# Patient Record
Sex: MALE | Race: WHITE | NOT HISPANIC OR LATINO | Employment: OTHER | URBAN - METROPOLITAN AREA
[De-identification: names, ages, dates, MRNs, and addresses within clinical notes are randomized per-mention and may not be internally consistent; named-entity substitution may affect disease eponyms.]

---

## 2021-09-27 ENCOUNTER — APPOINTMENT (OUTPATIENT)
Dept: GENERAL RADIOLOGY | Facility: CLINIC | Age: 56
End: 2021-09-27
Attending: PHYSICIAN ASSISTANT
Payer: COMMERCIAL

## 2021-09-27 ENCOUNTER — HOSPITAL ENCOUNTER (EMERGENCY)
Facility: CLINIC | Age: 56
Discharge: HOME | End: 2021-09-27
Attending: PHYSICIAN ASSISTANT | Admitting: PHYSICIAN ASSISTANT
Payer: COMMERCIAL

## 2021-09-27 VITALS
SYSTOLIC BLOOD PRESSURE: 126 MMHG | OXYGEN SATURATION: 98 % | DIASTOLIC BLOOD PRESSURE: 68 MMHG | WEIGHT: 220 LBS | HEART RATE: 67 BPM | TEMPERATURE: 98.1 F | RESPIRATION RATE: 20 BRPM

## 2021-09-27 DIAGNOSIS — M25.561 ACUTE PAIN OF RIGHT KNEE: ICD-10-CM

## 2021-09-27 PROCEDURE — 73562 X-RAY EXAM OF KNEE 3: CPT | Mod: RT

## 2021-09-27 PROCEDURE — 99284 EMERGENCY DEPT VISIT MOD MDM: CPT | Performed by: PHYSICIAN ASSISTANT

## 2021-09-27 PROCEDURE — 29505 APPLICATION LONG LEG SPLINT: CPT | Mod: RT | Performed by: PHYSICIAN ASSISTANT

## 2021-09-27 PROCEDURE — 99284 EMERGENCY DEPT VISIT MOD MDM: CPT | Mod: 25 | Performed by: PHYSICIAN ASSISTANT

## 2021-09-27 RX ORDER — OMEPRAZOLE 40 MG/1
40 CAPSULE, DELAYED RELEASE ORAL EVERY MORNING
COMMUNITY
Start: 2021-01-04

## 2021-09-27 RX ORDER — SIMVASTATIN 40 MG
40 TABLET ORAL EVERY MORNING
COMMUNITY
Start: 2021-01-04

## 2021-09-27 RX ORDER — OXYCODONE HYDROCHLORIDE 5 MG/1
5 TABLET ORAL EVERY 6 HOURS PRN
Qty: 20 TABLET | Refills: 0 | Status: SHIPPED | OUTPATIENT
Start: 2021-09-27

## 2021-09-27 RX ORDER — LOSARTAN POTASSIUM AND HYDROCHLOROTHIAZIDE 12.5; 1 MG/1; MG/1
1 TABLET ORAL EVERY MORNING
COMMUNITY
Start: 2021-01-04

## 2021-09-27 NOTE — ED TRIAGE NOTES
Pt was doing block work and fell hitting his right knee, he has extreme pain and is not able to bear weight, reports the knee david if he tries to stand on it

## 2021-09-27 NOTE — DISCHARGE INSTRUCTIONS
It was a pleasure working with you today!  I hope your condition improves rapidly!     Unfortunately, I think that you likely ruptured your quadriceps tendon at the kneecap.  The x-ray did not show any sign of fracture.  Ultimately, you will need an MRI for confirmation of this concern.  Please call home today and speak with your primary care provider to see if they can set something up for when you get home.  You also need to see an orthopedist in the next 1 week to discuss the MRI results and develop a plan.  You likely will need surgery for this.  Please keep your knee straight at all times.  Use your knee immobilizer.  Do NOT bear weight on your right leg at all.  Use the crutches at all times.  You can use ibuprofen 600 mg every 6 hours as needed for pain.  You can also use Tylenol up to 1000 mg every 6 hours as needed for pain.  Reserve the oxycodone for severe breakthrough pain.  Do not drive due to your injury, or operate any machinery while taking oxycodone given the sedation side effect.  To try and prevent blood clot during your travel, take 325 mg of aspirin once daily for couple days prior to your departure and then for a couple days after you return.  Please also move your ankle a lot during your travel which helps prevent blood clot.

## 2021-09-27 NOTE — ED PROVIDER NOTES
History     Chief Complaint   Patient presents with     Knee Pain     HPI  Carrington Langston is a 56 year old male who presents for evaluation of right knee pain.  Acute in onset.  Standing on a cement block on the ground.  The cement block turned over and he fell to the ground.  He is unsure if he fell onto the knee or not.  He had immediate onset of pain and rates his pain 6 on a scale of 10 currently.  States he has difficulties lifting his leg.  Has not taken anything to treat his pain.  Denies needing anything at this point.  Has never had troubles with his knee before.  He actually lives in [facility] and is up here helping his father of a house.  He denies any lower extremity numbness or tingling.  He denies any injuries elsewhere on his body.        Allergies:  No Known Allergies    Problem List:    There are no problems to display for this patient.       Past Medical History:    History reviewed. No pertinent past medical history.    Past Surgical History:    History reviewed. No pertinent surgical history.    Family History:    History reviewed. No pertinent family history.    Social History:  Marital Status:   [2]  Social History     Tobacco Use     Smoking status: Never Smoker     Smokeless tobacco: Never Used   Substance Use Topics     Alcohol use: Not Currently     Drug use: Not Currently        Medications:    losartan-hydrochlorothiazide (HYZAAR) 100-12.5 MG tablet  omeprazole (PRILOSEC) 40 MG DR capsule  oxyCODONE (ROXICODONE) 5 MG tablet  simvastatin (ZOCOR) 40 MG tablet          Review of Systems   All other systems reviewed and are negative.      Physical Exam   BP: 126/68  Pulse: 67  Temp: 98.1  F (36.7  C)  Resp: 20  Weight: 99.8 kg (220 lb)  SpO2: 98 %      Physical Exam  Vitals and nursing note reviewed.   Constitutional:       General: He is not in acute distress.     Appearance: He is not diaphoretic.   HENT:      Head: Normocephalic and atraumatic.      Right Ear:  External ear normal.      Left Ear: External ear normal.      Nose: Nose normal.      Mouth/Throat:      Pharynx: No oropharyngeal exudate.   Eyes:      General: No scleral icterus.        Right eye: No discharge.         Left eye: No discharge.      Conjunctiva/sclera: Conjunctivae normal.      Pupils: Pupils are equal, round, and reactive to light.   Neck:      Thyroid: No thyromegaly.   Cardiovascular:      Rate and Rhythm: Normal rate and regular rhythm.      Heart sounds: Normal heart sounds. No murmur heard.     Pulmonary:      Effort: Pulmonary effort is normal. No respiratory distress.      Breath sounds: Normal breath sounds. No wheezing or rales.   Chest:      Chest wall: No tenderness.   Abdominal:      General: Bowel sounds are normal. There is no distension.      Palpations: Abdomen is soft. There is no mass.      Tenderness: There is no abdominal tenderness. There is no guarding or rebound.   Musculoskeletal:      Cervical back: Normal range of motion and neck supple.      Comments: Knee with mild generalized swelling.  No bruising.  No abrasions or lacerations.  Patient has difficulties with lower leg extension.  He has a hard time lifting his leg off of the bed.  There appears to be a divot above his patella and I am concerned about quadricep rupture.No ligamentous instability with testing of the MCL, LCL, ACL, and PCL ligaments.   Distal pulses 2+ bilaterally.  Sensation intact to light touch.  Opposite knee exam is normal.    Lymphadenopathy:      Cervical: No cervical adenopathy.   Skin:     General: Skin is warm and dry.      Capillary Refill: Capillary refill takes less than 2 seconds.      Findings: No erythema or rash.   Neurological:      Mental Status: He is alert and oriented to person, place, and time.      Cranial Nerves: No cranial nerve deficit.   Psychiatric:         Behavior: Behavior normal.         Thought Content: Thought content normal.         ED Course        Procedures               Critical Care time:  none               Results for orders placed or performed during the hospital encounter of 09/27/21 (from the past 24 hour(s))   XR Knee Right 3 Views    Narrative    Examination:  XR KNEE RIGHT 3 VIEWS    Date:  9/27/2021 1:08 PM     Clinical Information: Right anterior superior knee pain after a fall.    Comparison: none.      Impression    Impression:    1.  Mode prominence/swelling of the prepatellar soft tissues. No  radiopaque foreign body. No fracture or traumatic malalignment.  Maintained joint spacing. No significant joint effusion.    KYLE ALLEN MD         SYSTEM ID:  [id number]       Medications - No data to display    Assessments & Plan (with Medical Decision Making)  Acute pain of right knee     56 year old male presents for evaluation of right knee pain that occurred when he slipped off of a concrete block that he was standing on the ground.  He does not recall falling onto the knee.  Had a cute onset of pain and has difficulty moving the knee due to the pain.  See HPI for details.  Pain is rated 6 on a scale of 10.  I offered him pain medication, but he declined.  On exam blood pressure 126/68, temperature 98.1, pulse 67, respiration 20, oxygen saturation 98% on room air.  Patient with a defect superior to the patella when utilized in the quadricep muscle concerning for quadricep rupture.  He is tender palpation around the knee.  The leg above and below the knee is normal to inspection and no tenderness both palpation.  Ankle and foot with normal range of motion no pain.  Hip normal.  No other sign of trauma.  X-ray displays no evidence for fracture.  I consulted with orthopedics on-call, Dr. Nava.  He stated that we could get an outpatient MRI and plan for operative treatment for the next 2-4 days here if the patient decided to do rehab here.  Dr. Nava stated he would need to be nonweightbearing for 6-8 weeks.  Given concerns for complications with travel, Dr. Nava  stated it would be best for the patient to likely return home and have his surgery locally given that he has more support there.  I discussed options with the patient, and he wanted to return home as well.  Therefore, we set him up with a knee immobilizer.  Discussed nonweightbearing status and to use crutches at all times.  Ibuprofen and Tylenol as needed for pain and use oxycodone for breakthrough pain.  Elevate the leg is much as possible.  Aspirin prophylaxis for travel to try and prevent DVT.  Indications for ED return reviewed with the patient.  Discussed that he should call his PCP now to schedule an MRI and get set up with orthopedics after the MRI to go over the results.  I openly discussed with the patient that he should get his surgery done within the next 2 weeks to decrease amount of scar tissue that could form.  Patient was in agreement with this plan was suitable for discharge.  His mother-in-law drove him home.     I have reviewed the nursing notes.    I have reviewed the findings, diagnosis, plan and need for follow up with the patient.       New Prescriptions    OXYCODONE (ROXICODONE) 5 MG TABLET    Take 1 tablet (5 mg) by mouth every 6 hours as needed for severe pain       Final diagnoses:   Acute pain of right knee - possible quadriceps tendon rupture     Disclaimer: This note consists of symbols derived from keyboarding, dictation and/or voice recognition software. As a result, there may be errors in the script that have gone undetected. Please consider this when interpreting information found in this chart.      9/27/2021   [facility] EMERGENCY DEPT     Steven Wilson PA-C  09/27/21 [id number]